# Patient Record
Sex: FEMALE | Race: WHITE | Employment: PART TIME | URBAN - METROPOLITAN AREA
[De-identification: names, ages, dates, MRNs, and addresses within clinical notes are randomized per-mention and may not be internally consistent; named-entity substitution may affect disease eponyms.]

---

## 2017-05-05 ENCOUNTER — EMPLOYEE WELLNESS (OUTPATIENT)
Dept: OTHER | Age: 62
End: 2017-05-05

## 2017-05-05 LAB
CHOLESTEROL, TOTAL: 195 MG/DL (ref 0–199)
GLUCOSE BLD-MCNC: 87 MG/DL (ref 74–107)
HDLC SERPL-MCNC: 92 MG/DL
LDL CHOLESTEROL CALCULATED: 91 MG/DL (ref 0–99)
TRIGL SERPL-MCNC: 60 MG/DL (ref 0–149)

## 2017-12-13 PROBLEM — N81.11 CYSTOCELE, MIDLINE: Status: ACTIVE | Noted: 2017-12-13

## 2018-03-14 ENCOUNTER — NURSE ONLY (OUTPATIENT)
Dept: ORTHOPEDIC SURGERY | Age: 63
End: 2018-03-14
Payer: COMMERCIAL

## 2018-03-14 DIAGNOSIS — M17.10 PATELLOFEMORAL ARTHRITIS: Primary | ICD-10-CM

## 2018-03-14 PROCEDURE — 20610 DRAIN/INJ JOINT/BURSA W/O US: CPT | Performed by: NURSE PRACTITIONER

## 2018-03-14 RX ORDER — HYALURONATE SODIUM 10 MG/ML
20 SYRINGE (ML) INTRAARTICULAR ONCE
Status: COMPLETED | OUTPATIENT
Start: 2018-03-14 | End: 2018-03-14

## 2018-03-14 RX ADMIN — Medication 20 MG: at 08:06

## 2018-03-20 VITALS — WEIGHT: 133 LBS | BODY MASS INDEX: 23.56 KG/M2

## 2018-03-21 ENCOUNTER — NURSE ONLY (OUTPATIENT)
Dept: ORTHOPEDIC SURGERY | Age: 63
End: 2018-03-21
Payer: COMMERCIAL

## 2018-03-21 DIAGNOSIS — M17.10 PATELLOFEMORAL ARTHRITIS: Primary | ICD-10-CM

## 2018-03-21 PROCEDURE — 20610 DRAIN/INJ JOINT/BURSA W/O US: CPT | Performed by: NURSE PRACTITIONER

## 2018-03-21 RX ORDER — HYALURONATE SODIUM 10 MG/ML
20 SYRINGE (ML) INTRAARTICULAR ONCE
Status: COMPLETED | OUTPATIENT
Start: 2018-03-21 | End: 2018-03-21

## 2018-03-21 RX ADMIN — Medication 20 MG: at 08:26

## 2018-03-28 ENCOUNTER — NURSE ONLY (OUTPATIENT)
Dept: ORTHOPEDIC SURGERY | Age: 63
End: 2018-03-28
Payer: COMMERCIAL

## 2018-03-28 DIAGNOSIS — M17.10 PATELLOFEMORAL ARTHRITIS: Primary | ICD-10-CM

## 2018-03-28 PROCEDURE — 20610 DRAIN/INJ JOINT/BURSA W/O US: CPT | Performed by: NURSE PRACTITIONER

## 2018-03-28 RX ORDER — HYALURONATE SODIUM 10 MG/ML
20 SYRINGE (ML) INTRAARTICULAR ONCE
Status: COMPLETED | OUTPATIENT
Start: 2018-03-28 | End: 2018-03-28

## 2018-03-28 RX ADMIN — Medication 20 MG: at 08:21

## 2018-03-28 RX ADMIN — Medication 20 MG: at 08:20

## 2018-05-18 ENCOUNTER — EMPLOYEE WELLNESS (OUTPATIENT)
Dept: OTHER | Age: 63
End: 2018-05-18

## 2018-05-19 LAB
CHOLESTEROL, TOTAL: 169 MG/DL (ref 0–199)
GLUCOSE BLD-MCNC: 70 MG/DL (ref 74–107)
HDLC SERPL-MCNC: 77 MG/DL
LDL CHOLESTEROL CALCULATED: 81 MG/DL (ref 0–99)
TRIGL SERPL-MCNC: 55 MG/DL (ref 0–149)

## 2018-05-29 VITALS — BODY MASS INDEX: 22.85 KG/M2 | WEIGHT: 129 LBS

## 2018-08-06 ENCOUNTER — OFFICE VISIT (OUTPATIENT)
Dept: ORTHOPEDIC SURGERY | Age: 63
End: 2018-08-06
Payer: COMMERCIAL

## 2018-08-06 DIAGNOSIS — M17.0 BILATERAL PRIMARY OSTEOARTHRITIS OF KNEE: Primary | ICD-10-CM

## 2018-08-06 DIAGNOSIS — S83.241A ACUTE MEDIAL MENISCUS TEAR OF RIGHT KNEE, INITIAL ENCOUNTER: ICD-10-CM

## 2018-08-06 DIAGNOSIS — S83.242A ACUTE MEDIAL MENISCUS TEAR, LEFT, INITIAL ENCOUNTER: ICD-10-CM

## 2018-08-06 PROCEDURE — 99213 OFFICE O/P EST LOW 20 MIN: CPT | Performed by: ORTHOPAEDIC SURGERY

## 2018-08-06 NOTE — PROGRESS NOTES
inspection: the skin appears warm, dry and intact. There is  a previous scar over the affected area. There is any cellulitis, lymphedema or cutaneous lesions noted in the lower extremities. Upon palpation there is no induration noted. Neurologic:  Gait: normal;  Motor exam of the lower extremities show ; quadriceps, hamstrings, foot dorsi and plantar flexors intact R.  5/5 and L. 5/5. Deep tendon reflexes are 2/4 at the knees and 2/4 at the ankles with strong extensor hallicus longus motor strength bilaterally. Sensory to both feet is intact to all sensory roots. Cardiovascular: The vascular exam is normal and is well perfused to distal extremities. Distal pulses DP/PT: R. 2+; L. 2+. There is cap refill noted less than two seconds in all digits. There is not edema of the bilateral lower extremities. There is not varicosities noted in the distal extremities. Lymph:  Upon palpation,  there is no lymphadenopathy noted in bilateral lower extremities. Musculoskeletal:  Gait: normal; examination of the nails and digits reveal no cyanosis or clubbing. Lumbar exam:  On visual inspection, there is not deformity of the spine. full range of motion, no tenderness, palpable spasm or pain on motion. Special tests: Straight Leg Raise negative, Ihsan test negative. Hip exam:   Upon inspection, there is not deformity noted. Upon palpation there is not tenderness. ROM: is  full and symmetrical.   Strength: Hip Flexors 5/5; Hip Abductors 5/5; Hip Adduction 5/5. Knee exam:  Bilateral knee exam shows;  range of motion of R. Knee is 0 to 130, and L. Knee is 0 to 130. The patient does have  pain on motion, effusion is none, there is tenderness over the  lateral, anterior region, there are not any masses, there is not ligamentous instability, there is not  deformity noted. Knee exam: bilateral positive for moderate crepitations, some mild tenderness laxity is noted with  stress.   There is not a popliteal cyst.    R. Knee:  Lachman's negative, Anterior Drawer negative, Posterior Drawer negative  Britt's positive, Thallasy  positive,   PF grind test positive, Apprehension test negative, Patellar J sign  negative  L. Knee:  Lachman's negative, Anterior Drawer negative, Posterior Drawer negative  Britt's positive, Thallasy  positive,   PF grind test positive, Apprehension test negative,  Patellar J sign  negative    Xray Exam:  Left knee:  1. No acute fracture or dislocation. 2. Mild to moderate degenerative joint disease of the left knee. Right knee: The bones appear to be in anatomic alignment. No fracture or foreign body is identified. There is joint space loss . The are degenerative changes present .           Radiographic findings reviewed with patient    Assessment:  Encounter Diagnoses   Name Primary?  Bilateral primary osteoarthritis of knee Yes    Acute medial meniscus tear of right knee, initial encounter     Acute medial meniscus tear, left, initial encounter        Plan:  Natural history and expected course discussed. Questions answered. Educational materials distributed. Rest, ice, compression, and elevation (RICE) therapy. Reduction in offending activity.   MRI bilateral knees as she has undergone many conservative treatments without relief

## 2018-08-21 ENCOUNTER — HOSPITAL ENCOUNTER (OUTPATIENT)
Dept: MRI IMAGING | Age: 63
Discharge: HOME | End: 2018-08-23
Payer: COMMERCIAL

## 2018-08-21 DIAGNOSIS — S83.241A ACUTE MEDIAL MENISCUS TEAR OF RIGHT KNEE, INITIAL ENCOUNTER: ICD-10-CM

## 2018-08-21 DIAGNOSIS — M17.0 BILATERAL PRIMARY OSTEOARTHRITIS OF KNEE: ICD-10-CM

## 2018-08-21 PROCEDURE — 73721 MRI JNT OF LWR EXTRE W/O DYE: CPT

## 2018-08-27 ENCOUNTER — OFFICE VISIT (OUTPATIENT)
Dept: ORTHOPEDIC SURGERY | Age: 63
End: 2018-08-27
Payer: COMMERCIAL

## 2018-08-27 VITALS — HEIGHT: 63 IN | WEIGHT: 129 LBS | TEMPERATURE: 98 F | BODY MASS INDEX: 22.86 KG/M2

## 2018-08-27 DIAGNOSIS — M17.10 PATELLOFEMORAL ARTHRITIS: Primary | ICD-10-CM

## 2018-08-27 PROCEDURE — 99215 OFFICE O/P EST HI 40 MIN: CPT | Performed by: ORTHOPAEDIC SURGERY

## 2018-08-27 NOTE — PROGRESS NOTES
Chief Complaint   Patient presents with    Knee Pain     Bilateral Knee, MRI F/U       Subjective:     Patient ID: Rosa Calle is a 58 y.o..  female    Knee Pain  Patient follow up today with her bilateral knee pain. She underwent Euflexxa injections in March. She states she the visco supplementation with minimal relief. She did have CSI with minimal relief as well. She has difficulty going up and down the steps. She has had an MRI of bilateral knees and is here today for results.     Past Medical History:   Diagnosis Date    Back pain     Female cystocele 2017    Fibromyalgia     Rectocele      Past Surgical History:   Procedure Laterality Date    CYSTOSCOPY  12/13/2017    anterior and posterior vaginal repair    ROTATOR CUFF REPAIR         Current Outpatient Prescriptions:     docusate sodium (COLACE) 100 MG capsule, Take 1 capsule by mouth daily as needed for Constipation, Disp: 60 capsule, Rfl: 0    HYDROcodone-acetaminophen (NORCO) 5-325 MG per tablet, Take 1 tablet by mouth every 6 hours as needed for Pain ., Disp: 10 tablet, Rfl: 0    Omega-3 Fatty Acids (OMEGA-3 FISH OIL PO), Take by mouth 2 times daily, Disp: , Rfl:     B-Complex-C CAPS, Take by mouth daily, Disp: , Rfl:     calcium carbonate (OSCAL) 500 MG TABS tablet, Take 500 mg by mouth daily, Disp: , Rfl:     Bone Growth Stimulator (E) MISC, Take 3 tablets by mouth nightly, Disp: , Rfl:     OIL OF OREGANO PO, Take by mouth 2 times daily, Disp: , Rfl:     aspirin 81 MG tablet, Take 81 mg by mouth nightly, Disp: , Rfl:     Turmeric (CURCUMIN 95) 500 MG CAPS, Take by mouth daily, Disp: , Rfl:     vitamin C (ASCORBIC ACID) 500 MG tablet, Take 500 mg by mouth 2 times daily, Disp: , Rfl:     docusate sodium (COLACE) 100 MG capsule, Take 100 mg by mouth 2 times daily, Disp: , Rfl:     fluticasone (FLONASE) 50 MCG/ACT nasal spray, 1 spray by Nasal route daily (Patient taking differently: 1 spray by Nasal route daily as needed ), Disp: 1 Bottle, Rfl: 3  Allergies   Allergen Reactions    Sulfa Antibiotics     Percocet [Oxycodone-Acetaminophen] Rash     Social History     Social History    Marital status:      Spouse name: N/A    Number of children: N/A    Years of education: N/A     Occupational History    Not on file. Social History Main Topics    Smoking status: Former Smoker     Quit date: 11/6/2014    Smokeless tobacco: Never Used    Alcohol use No    Drug use: No    Sexual activity: Not on file     Other Topics Concern    Not on file     Social History Narrative    No narrative on file     History reviewed. No pertinent family history. REVIEW OF SYSTEMS:     General/Constitution:  (-)weight loss, (-)fever, (-)chills, (-)weakness. Skin: (-) rash,(-) psoriasis,(-) eczema, (-)skin cancer. Musculoskeletal: (-) fractures,  (-) dislocations,(-) collagen vascular disease, (-) fibromyalgia, (-) multiple sclerosis, (-) muscular dystrophy, (-) RSD,(-) joint pain (-)swelling, (-) joint pain,swelling. Neurologic: (-) epilepsy, (-)seizures,(-) brain tumor,(-) TIA, (-)stroke, (-)headaches, (-)Parkinson disease,(-) memory loss, (-) LOC. Cardiovascular: (-) Chest pain, (-) swelling in legs/feet, (-) SOB, (-) cramping in legs/feet with walking. Respiratory: (-) SOB, (-) Coughing, (-) night sweats. GI: (-) nausea, (-) vomiting, (-) diarrhea, (-) blood in stool, (-) gastric ulcer. Psychiatric: (-) Depression, (-) Anxiety, (-) bipolar disease, (-) Alzheimer's Disease  Allergic/Immunologic: (-) allergies latex, (-) allergies metal, (-) skin sensitivity. Hematlogic: (-) anemia, (-) blood transfusion, (-) DVT/PE, (-) Clotting disorders      Subjective:    Constitution:  Ht. 5 ft 3 in. , Wt. 130 lbs. Psycihatric:  The patient is alert and oriented x 3, appears to be stated age and in no distress. Respiratory:  Respiratory effort is not labored. Patient is not gasping.   Palpation of the chest reveals no tactile detail. Assessment:  Encounter Diagnoses   Name Primary?  Patellofemoral arthritis Yes       Plan:  I had a lengthy discussion with the patient regarding their diagnosis. I explained treatment options including surgical vs non surgical treatment. I reviewed in detail the risks and benefits and outlined the procedure in detail with expected outcomes and possible complications. I also discussed non surgical treatment such as injections (CSI and visco supplementation), physical therapy, topical creams and NSAID's. They have elected for surgical management at this time. The risks and benefits of a knee arthroscopy were discussed with the patient. The risks are including but not limited to: infection, injuries to blood vessels and nerves, non relief of symptoms, arthrofibrosis of knee, need for further operative intervention, blood loss, PE/DVT, MI and death. The risks are understood by the patient and they wish to proceed with a Bilateral knee arthroscopy, medial menisectomy and debridement. She would like to think about the options and we will see her back once she decides. At least 40 minutes was spent discussing the diagnosis and treatment options with the patient with at least 50% of the time was spent with decision making and counseling the patient.

## 2018-10-26 ENCOUNTER — TELEPHONE (OUTPATIENT)
Dept: ADMINISTRATIVE | Age: 63
End: 2018-10-26

## 2018-11-26 ENCOUNTER — HOSPITAL ENCOUNTER (OUTPATIENT)
Age: 63
Discharge: HOME | End: 2018-11-28
Payer: COMMERCIAL

## 2018-11-26 PROCEDURE — 87624 HPV HI-RISK TYP POOLED RSLT: CPT

## 2018-11-26 PROCEDURE — 88175 CYTOPATH C/V AUTO FLUID REDO: CPT

## 2018-12-06 LAB
CORRESPONDING PAP CASE #: NORMAL
HPV, HIGH RISK: NEGATIVE

## 2018-12-07 ENCOUNTER — NURSE ONLY (OUTPATIENT)
Dept: ORTHOPEDIC SURGERY | Age: 63
End: 2018-12-07
Payer: COMMERCIAL

## 2018-12-07 DIAGNOSIS — M17.0 BILATERAL PRIMARY OSTEOARTHRITIS OF KNEE: Primary | ICD-10-CM

## 2018-12-07 PROCEDURE — 20610 DRAIN/INJ JOINT/BURSA W/O US: CPT | Performed by: NURSE PRACTITIONER

## 2018-12-07 NOTE — PROGRESS NOTES
Verbal and written consent was obtained by the patient. The following is a well known to me that is here for bilateral knee injections. They are here for  Euflexxa # 1. Her knees were prepped in sterile fashion. Euflexxa 20 mg injected to Bilateral knees. The patient tolerated the injections well and I will see the patient back in 1 week for repeat injections.

## 2018-12-12 ENCOUNTER — NURSE ONLY (OUTPATIENT)
Dept: ORTHOPEDIC SURGERY | Age: 63
End: 2018-12-12
Payer: COMMERCIAL

## 2018-12-12 ENCOUNTER — HOSPITAL ENCOUNTER (OUTPATIENT)
Dept: GENERAL RADIOLOGY | Age: 63
Discharge: HOME | End: 2018-12-14
Payer: COMMERCIAL

## 2018-12-12 DIAGNOSIS — Z12.31 VISIT FOR SCREENING MAMMOGRAM: ICD-10-CM

## 2018-12-12 DIAGNOSIS — Z13.820 ENCOUNTER FOR SCREENING FOR OSTEOPOROSIS: ICD-10-CM

## 2018-12-12 DIAGNOSIS — M17.0 BILATERAL PRIMARY OSTEOARTHRITIS OF KNEE: Primary | ICD-10-CM

## 2018-12-12 DIAGNOSIS — R52 PAIN: ICD-10-CM

## 2018-12-12 PROCEDURE — 20610 DRAIN/INJ JOINT/BURSA W/O US: CPT | Performed by: NURSE PRACTITIONER

## 2018-12-12 PROCEDURE — 77063 BREAST TOMOSYNTHESIS BI: CPT

## 2018-12-12 PROCEDURE — 74022 RADEX COMPL AQT ABD SERIES: CPT

## 2018-12-12 PROCEDURE — 77080 DXA BONE DENSITY AXIAL: CPT

## 2018-12-12 PROCEDURE — 99999 PR OFFICE/OUTPT VISIT,PROCEDURE ONLY: CPT | Performed by: NURSE PRACTITIONER

## 2018-12-19 ENCOUNTER — NURSE ONLY (OUTPATIENT)
Dept: ORTHOPEDIC SURGERY | Age: 63
End: 2018-12-19
Payer: COMMERCIAL

## 2018-12-19 DIAGNOSIS — M17.0 BILATERAL PRIMARY OSTEOARTHRITIS OF KNEE: Primary | ICD-10-CM

## 2018-12-19 PROCEDURE — 20610 DRAIN/INJ JOINT/BURSA W/O US: CPT | Performed by: NURSE PRACTITIONER

## 2018-12-19 RX ORDER — HYALURONATE SODIUM 10 MG/ML
20 SYRINGE (ML) INTRAARTICULAR ONCE
Status: COMPLETED | OUTPATIENT
Start: 2018-12-19 | End: 2018-12-19

## 2018-12-19 RX ADMIN — Medication 20 MG: at 08:45

## 2018-12-19 RX ADMIN — Medication 20 MG: at 08:44

## 2020-01-07 ENCOUNTER — HOSPITAL ENCOUNTER (OUTPATIENT)
Dept: GENERAL RADIOLOGY | Age: 65
Discharge: HOME | End: 2020-01-09
Payer: MEDICARE

## 2020-01-07 PROCEDURE — 77063 BREAST TOMOSYNTHESIS BI: CPT

## 2020-04-24 ENCOUNTER — HOSPITAL ENCOUNTER (OUTPATIENT)
Age: 65
Discharge: HOME | End: 2020-04-26
Payer: MEDICARE

## 2020-04-24 ENCOUNTER — TELEPHONE (OUTPATIENT)
Dept: PRIMARY CARE CLINIC | Age: 65
End: 2020-04-24

## 2020-04-24 ENCOUNTER — OFFICE VISIT (OUTPATIENT)
Dept: PRIMARY CARE CLINIC | Age: 65
End: 2020-04-24
Payer: MEDICARE

## 2020-04-24 VITALS
SYSTOLIC BLOOD PRESSURE: 118 MMHG | HEIGHT: 63 IN | OXYGEN SATURATION: 98 % | RESPIRATION RATE: 18 BRPM | BODY MASS INDEX: 22.68 KG/M2 | TEMPERATURE: 98 F | HEART RATE: 77 BPM | WEIGHT: 128 LBS | DIASTOLIC BLOOD PRESSURE: 76 MMHG

## 2020-04-24 LAB — SARS-COV-2, NAAT: NOT DETECTED

## 2020-04-24 PROCEDURE — G8420 CALC BMI NORM PARAMETERS: HCPCS | Performed by: PHYSICIAN ASSISTANT

## 2020-04-24 PROCEDURE — 1123F ACP DISCUSS/DSCN MKR DOCD: CPT | Performed by: PHYSICIAN ASSISTANT

## 2020-04-24 PROCEDURE — G8399 PT W/DXA RESULTS DOCUMENT: HCPCS | Performed by: PHYSICIAN ASSISTANT

## 2020-04-24 PROCEDURE — 99214 OFFICE O/P EST MOD 30 MIN: CPT | Performed by: PHYSICIAN ASSISTANT

## 2020-04-24 PROCEDURE — G8428 CUR MEDS NOT DOCUMENT: HCPCS | Performed by: PHYSICIAN ASSISTANT

## 2020-04-24 PROCEDURE — 4040F PNEUMOC VAC/ADMIN/RCVD: CPT | Performed by: PHYSICIAN ASSISTANT

## 2020-04-24 PROCEDURE — 3017F COLORECTAL CA SCREEN DOC REV: CPT | Performed by: PHYSICIAN ASSISTANT

## 2020-04-24 PROCEDURE — U0002 COVID-19 LAB TEST NON-CDC: HCPCS

## 2020-04-24 PROCEDURE — 1090F PRES/ABSN URINE INCON ASSESS: CPT | Performed by: PHYSICIAN ASSISTANT

## 2020-04-24 PROCEDURE — 1036F TOBACCO NON-USER: CPT | Performed by: PHYSICIAN ASSISTANT

## 2020-04-24 RX ORDER — BIOTIN 1 MG
TABLET ORAL
COMMUNITY

## 2020-04-24 RX ORDER — CHOLECALCIFEROL (VITAMIN D3) 10(400)/ML
DROPS ORAL
COMMUNITY

## 2020-04-24 NOTE — PATIENT INSTRUCTIONS
with the person who is sick should not stay in the same room with them, or they should wear a facemask if they enter a room with the person who is sick. Cover your coughs and sneezes   ; Cover: Cover your mouth and nose with a tissue when you cough or sneeze.   ; Dispose: Throw used tissues in a lined trash can.   ; Wash hands: Immediately wash your hands with soap and water for at least 20 seconds or, if soap and water are not available, clean your hands with an alcohol-based hand  that contains at least 60% alcohol. Clean your hands often   ; Wash hands: Wash your hands often with soap and water for at least 20 seconds, especially after blowing your nose, coughing, or sneezing; going to the bathroom; and before eating or preparing food.   ; Hand : If soap and water are not readily available, use an alcohol-based hand  with at least 60% alcohol, covering all surfaces of your hands and rubbing them together until they feel dry.   ; Soap and water: Soap and water are the best option if hands are visibly dirty.   ; Avoid touching: Avoid touching your eyes, nose, and mouth with unwashed hands. Handwashing Tips   ; Wet your hands with clean, running water (warm or cold), turn off the tap, and apply soap.  ; Lather your hands by rubbing them together with the soap. Lather the backs of your hands, between your fingers, and under your nails. ; Scrub your hands for at least 20 seconds. Need a timer? Hum the [place] from beginning to end twice.  ; Rinse your hands well under clean, running water.  ; Dry your hands using a clean towel or air dry them. Avoid sharing personal household items   ; Do not share: You should not share dishes, drinking glasses, cups, eating utensils, towels, or bedding with other people or pets in your home.   ; Wash thoroughly after use: After using these items, they should be washed thoroughly with soap and water.   Clean all high-touch surfaces

## 2020-04-24 NOTE — TELEPHONE ENCOUNTER
Patient enrolled in the loop program with email [email or web address]    email confirmed with patient

## 2020-04-24 NOTE — PROGRESS NOTES
times daily, Disp: , Rfl:     aspirin 81 MG tablet, Take 81 mg by mouth nightly, Disp: , Rfl:     Turmeric (CURCUMIN 95) 500 MG CAPS, Take by mouth daily, Disp: , Rfl:     vitamin C (ASCORBIC ACID) 500 MG tablet, Take 500 mg by mouth 2 times daily, Disp: , Rfl:     fluticasone (FLONASE) 50 MCG/ACT nasal spray, 1 spray by Nasal route daily (Patient taking differently: 1 spray by Nasal route daily as needed ), Disp: 1 Bottle, Rfl: 3    HYDROcodone-acetaminophen (NORCO) 5-325 MG per tablet, Take 1 tablet by mouth every 6 hours as needed for Pain . (Patient not taking: Reported on 2020), Disp: 10 tablet, Rfl: 0    docusate sodium (COLACE) 100 MG capsule, Take 100 mg by mouth 2 times daily, Disp: , Rfl:     Allergies   Allergen Reactions    Sulfa Antibiotics     Percocet [Oxycodone-Acetaminophen] Rash       Social History     Tobacco Use    Smoking status: Former Smoker     Last attempt to quit: 2014     Years since quittin.4    Smokeless tobacco: Never Used   Substance Use Topics    Alcohol use: No     Alcohol/week: 0.0 standard drinks    Drug use: No       Patient lives at home. Vitals:    20 0919   BP: 118/76   Pulse: 77   Resp: 18   Temp: 98 °F (36.7 °C)   SpO2: 98%   Weight: 128 lb (58.1 kg)   Height: 5' 3\" (1.6 m)       Exam:  Nurse's notes and vital signs reviewed. The patient is not hypoxic. ? General: Alert, no acute distress, patient resting comfortably Patient is not toxic or lethargic. Skin: Warm, intact, no pallor noted. There is no evidence of rash at this time. Head: Normocephalic, atraumatic  Eye: Normal conjunctiva  Ears, Nose, Throat: Right tympanic membrane clear, left tympanic membrane clear. No drainage or discharge noted. No pre- or post-auricular tenderness, erythema, or swelling noted. No rhinorrhea or congestion noted. Posterior oropharynx shows no erythema, tonsillar hypertrophy, or exudate. the uvula is midline. No trismus or drooling is noted.    Moist

## 2020-04-30 ENCOUNTER — TELEPHONE (OUTPATIENT)
Dept: PRIMARY CARE CLINIC | Age: 65
End: 2020-04-30

## 2020-07-02 ENCOUNTER — TELEPHONE (OUTPATIENT)
Dept: SLEEP MEDICINE | Age: 65
End: 2020-07-02

## 2020-07-08 ENCOUNTER — TELEMEDICINE (OUTPATIENT)
Dept: SLEEP MEDICINE | Age: 65
End: 2020-07-08
Payer: MEDICARE

## 2020-07-08 PROCEDURE — 3017F COLORECTAL CA SCREEN DOC REV: CPT | Performed by: INTERNAL MEDICINE

## 2020-07-08 PROCEDURE — G8399 PT W/DXA RESULTS DOCUMENT: HCPCS | Performed by: INTERNAL MEDICINE

## 2020-07-08 PROCEDURE — G8428 CUR MEDS NOT DOCUMENT: HCPCS | Performed by: INTERNAL MEDICINE

## 2020-07-08 PROCEDURE — 1123F ACP DISCUSS/DSCN MKR DOCD: CPT | Performed by: INTERNAL MEDICINE

## 2020-07-08 PROCEDURE — 1090F PRES/ABSN URINE INCON ASSESS: CPT | Performed by: INTERNAL MEDICINE

## 2020-07-08 PROCEDURE — 99204 OFFICE O/P NEW MOD 45 MIN: CPT | Performed by: INTERNAL MEDICINE

## 2020-07-08 PROCEDURE — 4040F PNEUMOC VAC/ADMIN/RCVD: CPT | Performed by: INTERNAL MEDICINE

## 2020-07-08 NOTE — PROGRESS NOTES
Florin Maldonado is a 71 y.o. female being evaluated by a Virtual Visit (video visit) encounter to address concerns as mentioned below. A caregiver was present when appropriate. Due to this being a TeleHealth encounter (During RWK-63 public health emergency), evaluation of the following organ systems was limited: Vitals/Constitutional/EENT/Resp/CV/GI//MS/Neuro/Skin/Heme-Lymph-Imm. Pursuant to the emergency declaration under the [address] [place] [address] and the [employer] Act, this Virtual Visit was conducted with patient's (and/or legal guardian's) consent, to reduce the patient's risk of exposure to COVID-19 and provide necessary medical care. The patient (and/or legal guardian) has also been advised to contact this office for worsening conditions or problems, and seek emergency medical treatment and/or call 911 if deemed necessary. Patient identification was verified at the start of the visit: Yes    Total time spent for this encounter: 47 min  Start time: 4:13pm  End time: 4:50pm    Services were provided through a video synchronous discussion virtually to substitute for in-person clinic visit. Patient was located at home. Provider was located at the [facility]. --Yung March MD on 2020 at 4:11 PM    An electronic signature was used to authenticate this note. [facility] Sleep Medicine    Patient Name: Florin Maldonado  Age: 71 y.o.   : 1955    Date of Visit: 20      HPI   Florin Maldonado is a 71 y.o. woman with fibromyalgia and history of BENITEZ on oral appliance therapy who presents as a new patient to Sleep Clinic, referred by her PCP Dr. Lisa Bland, for poor sleep quality. \"I've never been a very good sleeper. \" In the mid-, she was diagnosed with fibromyalgia, and she knows that that has played into her sleep disturbances.  She says she is in a \"constant dream state\" and she never feels like she gets a good night sleep, unless she takes half a Benadryl. She completed a sleep study in 2009 (at [facility], with severe BENITEZ - AHI 37), and at that time was dealing with significant back pain -- she wonders if that may have contributed to the findings, as she does not agree that she has sleep apnea. Between 5-10 years ago, her dentist made her an oral appliance; she had been wearing it, but didn't notice any difference in the quality of her sleep. She recently stopped using it because it was thought to be contributing to TMJ symptoms. She has never tried CPAP other than in the sleep study. The dreams are vivid and occasionally intense. She denies any dream-enactment behavior, leg kicking/jerking, or sleepwalking. She considers her dreams to be \"exhausting. \"    She does not snore, other than when she has a URI. She has not been told of pauses in her breathing. She denies the sensation of choking/gasping. She does not usually feel refreshed in the morning. She does have headaches in the morning but she attributes this to her neck positioning. She wakes up a lot with stiffness that she attributes to her fibromyalgia. She usually sleeps on her side. She cannot fall asleep on her back because it is physically uncomfortable. Her weight has generally been stable. She tries to get 7-8 hours of sleep nightly. She usually gets in bed between 9:30-10:30pm, depending on what she has been planned for her morning workout. She usually falls asleep pretty quickly. Only once every 1-2 months does it take her a long time to fall asleep. She usually wakes up after 1.5-2 hours, uses the bathroom, and then gets back into bed. She may wake up 2-3 times total to use the bathroom but can fall back asleep easily. She usually wakes up between 5-6:30am on most weekdays; she may sleep in on Sundays.  She very rarely naps (doesn't have time with her full time job) and in general feels like she has enough energy. She denies any drowsiness while driving. Her  has severe BENITEZ and wears CPAP. STOP-BANG score of 1/8 for  (-)snoring, (-)daytime fatigue, (-)observed apneas, (-)high blood pressure, (-)BMI>35, (+)age >50, (-)neck circumference >15in, (-)female gender    [place] Sleepiness Scale not completed today (scores of 10 or higher are considered indicative of excessive daytime sleepiness)    [facility] Depression Scale (BEATRIZ-D) not completed today  (scores of 16 or higher may be suggestive of depressive or mood-related issues). PMH:  Past Medical History:   Diagnosis Date    Back pain     Female cystocele 2017    Fibromyalgia     Rectocele         PSH:  Past Surgical History:   Procedure Laterality Date    CYSTOSCOPY  2017    anterior and posterior vaginal repair    ROTATOR CUFF REPAIR     Hx of adenotonsillectomy    Soc Hx:  Social History     Tobacco Use    Smoking status: Former Smoker     Last attempt to quit: 2014     Years since quittin.6    Smokeless tobacco: Never Used   Substance Use Topics    Alcohol use: No     Alcohol/week: 0.0 standard drinks    Drug use: No        Fam Hx:  No family history on file. Current Outpatient Medications   Medication Sig Dispense Refill    Biotin 1000 MCG TABS Take by mouth      Cholecalciferol (VITAMIN D) 10 MCG/ML LIQD Take by mouth      HYDROcodone-acetaminophen (NORCO) 5-325 MG per tablet Take 1 tablet by mouth every 6 hours as needed for Pain .  (Patient not taking: Reported on 2020) 10 tablet 0    Omega-3 Fatty Acids (OMEGA-3 FISH OIL PO) Take by mouth 2 times daily      B-Complex-C CAPS Take by mouth daily      calcium carbonate (OSCAL) 500 MG TABS tablet Take 500 mg by mouth daily      Bone Growth Stimulator (E) MISC Take 3 tablets by mouth nightly      OIL OF OREGANO PO Take by mouth 2 times daily      aspirin 81 MG tablet Take 81 mg by mouth nightly      Turmeric (CURCUMIN 95) 500 MG CAPS Take by mouth daily      vitamin C (ASCORBIC ACID) 500 MG tablet Take 500 mg by mouth 2 times daily      docusate sodium (COLACE) 100 MG capsule Take 100 mg by mouth 2 times daily      fluticasone (FLONASE) 50 MCG/ACT nasal spray 1 spray by Nasal route daily (Patient taking differently: 1 spray by Nasal route daily as needed ) 1 Bottle 3     No current facility-administered medications for this visit. Review of Systems  Review of Systems   +weight stable  +chronic pain related to fibromyalgia  Objective:   Physical Exam  There were no vitals taken for this visit. There were no vitals filed for this visit. Physical Exam  Thin  female, alert and interactive  Mallampati IV, tonsils surgically absent     PROCEDURE HISTORY  1. Split-night PSG 7/12/2009 at [address] (BMI 23.7): AHI 37, supine AHI 67 (63% of TST), non-supine AHI 22, REM AHI 32 (19% of TST), non-REM AHI 38, SpO2 lavern 90% --> CPAP 4-9 cm of water trialed, no optimal pressure found in part due to poor sleep efficiency      PERTINENT LAB RESULTS  TSH   Date Value Ref Range Status   11/28/2017 4.210 (H) 0.270 - 4.200 uIU/mL Final      No results found for: FERRITIN     Assessment & Plan:   Mihaela Cr is a 71 y.o. woman with fibromyalgia and history of BENITEZ on oral appliance therapy who presents as a new patient to Sleep Clinic with a chief complaint of poor sleep quality secondary to \"exhausting, constant dreams. \" She has a history of severe BENITEZ as diagnosed per PSG in 2009 at [facility] (at similar BMI to current), for which she has used oral appliance therapy to unknown efficacy. She denies any obvious symptoms of BENITEZ and has a normal STOP-BANG score. It is possible that her perception of constant dream state is secondary to frequent REM-related sleep-disordered breathing.  Given her history, a repeat study is indicated at this time in order to reassess potential BENITEZ and discuss treatment options if present. 1. Obstructive Sleep Apnea      Normal STOP-BANG 1/8 but with history of severe BENITEZ per PSG 2009 at [facility]. Only treated with oral appliance therapy to this point (with uncertain efficacy). Will proceed with in-lab split-night polysomnography to re-evaluate. Order placed today for [facility].  Discussed pathophysiology of BENITEZ and its impact on daily well-being, as well as cardiometabolic and neurocognitive health (particularly in moderate-severe cases).  Will review CPAP and other potential therapy options if PSG is positive. Not discussed in detail today.  Counseled on risks of driving while drowsy.  The patient is aware that weight gain may worsen the severity of obstructive sleep apnea. 2. Sleep Disturbances      Contributing factors include perception of chronic dream state and chronic pain secondary to fibromyalgia. Patient seems to adhere to good sleep hygiene practices.  Will assess results of PSG and benefit of treatment, if ultimately prescribed. Patient will follow up within 3 months of completing PSG. A total of 47 minutes' time was spent with the patient, of which >50% was spent in face-to-face discussion and counseling.      Angie Alcantar MD

## 2020-07-08 NOTE — Clinical Note
David Messina,    Can you please send a copy of my note to her PCP? Don't think he is in [address] Also, please add her to the list of patients that Britney Trivedi will need to see. We'll see what her sleep study shows but I imagine she will probably need to see him.     Thanks, Subha Viveros

## 2020-07-09 ENCOUNTER — TELEPHONE (OUTPATIENT)
Dept: SLEEP CENTER | Age: 65
End: 2020-07-09

## 2020-07-17 ENCOUNTER — NURSE ONLY (OUTPATIENT)
Dept: PRIMARY CARE CLINIC | Age: 65
End: 2020-07-17

## 2020-07-17 ENCOUNTER — HOSPITAL ENCOUNTER (OUTPATIENT)
Age: 65
Discharge: HOME | End: 2020-07-19
Payer: MEDICARE

## 2020-07-17 PROCEDURE — U0003 INFECTIOUS AGENT DETECTION BY NUCLEIC ACID (DNA OR RNA); SEVERE ACUTE RESPIRATORY SYNDROME CORONAVIRUS 2 (SARS-COV-2) (CORONAVIRUS DISEASE [COVID-19]), AMPLIFIED PROBE TECHNIQUE, MAKING USE OF HIGH THROUGHPUT TECHNOLOGIES AS DESCRIBED BY CMS-2020-01-R: HCPCS

## 2020-07-19 LAB
SARS-COV-2: NOT DETECTED
SOURCE: NORMAL

## 2020-07-21 ENCOUNTER — TELEPHONE (OUTPATIENT)
Dept: SLEEP CENTER | Age: 65
End: 2020-07-21

## 2020-07-21 ENCOUNTER — TELEPHONE (OUTPATIENT)
Dept: SLEEP MEDICINE | Age: 65
End: 2020-07-21

## 2020-07-22 ENCOUNTER — HOSPITAL ENCOUNTER (OUTPATIENT)
Dept: SLEEP CENTER | Age: 65
Discharge: HOME | End: 2020-07-22
Payer: MEDICARE

## 2020-07-22 PROCEDURE — 95810 POLYSOM 6/> YRS 4/> PARAM: CPT | Performed by: INTERNAL MEDICINE

## 2020-07-22 PROCEDURE — 95810 POLYSOM 6/> YRS 4/> PARAM: CPT

## 2020-07-23 ENCOUNTER — HOSPITAL ENCOUNTER (OUTPATIENT)
Dept: CT IMAGING | Age: 65
Discharge: HOME | End: 2020-07-25
Payer: MEDICARE

## 2020-07-23 PROCEDURE — 2580000003 HC RX 258: Performed by: RADIOLOGY

## 2020-07-23 PROCEDURE — 70498 CT ANGIOGRAPHY NECK: CPT

## 2020-07-23 PROCEDURE — 6360000004 HC RX CONTRAST MEDICATION: Performed by: RADIOLOGY

## 2020-07-23 RX ORDER — SODIUM CHLORIDE 0.9 % (FLUSH) 0.9 %
10 SYRINGE (ML) INJECTION ONCE
Status: COMPLETED | OUTPATIENT
Start: 2020-07-23 | End: 2020-07-23

## 2020-07-23 RX ADMIN — IOPAMIDOL 60 ML: 755 INJECTION, SOLUTION INTRAVENOUS at 17:18

## 2020-07-23 RX ADMIN — Medication 10 ML: at 17:18

## 2020-08-02 NOTE — PROGRESS NOTES
[address]                               SLEEP STUDY REPORT    PATIENT NAME: Edmund Rodriguez                      :        1955  MED REC NO:   [id number]                            ROOM:  ACCOUNT NO:   [de-identified]                           ADMIT DATE: 2020  PROVIDER:     Gunjan Hylton MD    DATE OF STUDY:  2020    FULL-NIGHT POLYSOMNOGRAM REPORT    LOCATION:  [address] [place] [address]. REFERRING PROVIDER:  Gunjan Hylton MD    AGE:  71 years. SEX:  Female. HEIGHT:  5 feet 3 inches. WEIGHT:  128 pounds. BMI:  22.7 kg/m2. NECK CIRCUMFERENCE:  12 inches. SYMPTOMS:  Morning headaches, nocturia, teeth grinding at night, vivid  dreams. The English Sleepiness Scale was 3/24 (scores above or equal to  10 are suggestive of hypersomnolence). INDICATIONS:  To re-evaluate the severity of obstructive sleep apnea. MEDICAL HISTORY:  Fibromyalgia, history of obstructive sleep apnea, on  oral appliance therapy (PSG in  at [facility] with AHI 37). MEDICATIONS:  Oxybutynin. DESCRIPTION:  This full-night polysomnogram consisted of EEG, EOG, EMG,  and two-lead ECG monitoring. Oronasal airflow (nasal pressure  transducer and thermistor), chest and abdominal efforts by respiratory  inductive plethysmography or polyvinylidene fluoride sensor, and pulse  oximetry were monitored as well. Hypopneas were scored as at least a  30% reduction in amplitude of the semiquantitative flow signal,  associated with a 4% or greater oxygen desaturation. Respiratory  effort-related arousals (RERAs) were scored as at least a 30% reduction  in amplitude of the semiquantitative flow signal, associated with an EEG  microarousal.    FINDINGS:  SLEEP CONTINUITY AND SLEEP ARCHITECTURE:  Lights were turned off at  10:28 p.m. and lights were turned on at 5:56 a.m.   Total recording time  was 447 minutes nature of this  patient's sleep-disordered breathing. 3.  Occasional bruxism was noted. RECOMMENDATIONS:  1. Treatment for mild obstructive sleep apnea should be based on the  patient's symptoms and comorbidities. Treatment options include CPAP  therapy, oral appliance therapy, and/or ENT evaluation. These options  can be further discussed with the patient during followup in the [place] [id number]. 2.  In light of the patient's bruxism, concurrent with her mild  obstructive sleep apnea, oral appliance therapy may be an ideal modality. She can be referred to a sleep dentist if she so desires. 3.  The patient should be counseled against driving while drowsy. 4.  The patient should be aware that weight gain may increase the  severity of obstructive sleep apnea.       Samara Fleischer, MD    D: 08/01/2020 7:37:43       T: 08/01/2020 8:33:08     [email or web address]  Job#: [id number]     Doc#: [id number]    CC:

## 2020-08-04 ENCOUNTER — TELEPHONE (OUTPATIENT)
Dept: SLEEP MEDICINE | Age: 65
End: 2020-08-04

## 2020-08-05 ENCOUNTER — TELEPHONE (OUTPATIENT)
Dept: SLEEP MEDICINE | Age: 65
End: 2020-08-05

## 2020-08-05 NOTE — TELEPHONE ENCOUNTER
Pt called in and started she has appt on 8/12 with Dr Maritza Wade and  would like SS faxed to her at [phone]

## 2020-08-05 NOTE — TELEPHONE ENCOUNTER
Pt ret call and discussed ss results--pt is willing to go to Dr Genoveva Garcia to have new dental appliance made for her BENITEZ and teeth grinding--gave pt Dr's number to call to get appt--pt does still want to come to office to have SS explained to her further and she has several more questions--appt made for 8/26 1500

## 2020-08-10 ENCOUNTER — TELEPHONE (OUTPATIENT)
Dept: SLEEP MEDICINE | Age: 65
End: 2020-08-10

## 2020-08-31 ENCOUNTER — TELEPHONE (OUTPATIENT)
Dept: SLEEP MEDICINE | Age: 65
End: 2020-08-31

## 2020-09-02 NOTE — TELEPHONE ENCOUNTER
Pt ret call and I did explain to her that Dr Denise Zayas does not have a schedule yet and that I will call her as soon as his schedule is put in the system--pt does need either earliest or latest appt d/t work

## 2020-09-25 ENCOUNTER — TELEPHONE (OUTPATIENT)
Dept: SLEEP MEDICINE | Age: 65
End: 2020-09-25

## 2020-09-30 ENCOUNTER — TELEPHONE (OUTPATIENT)
Dept: SLEEP MEDICINE | Age: 65
End: 2020-09-30

## 2020-09-30 NOTE — TELEPHONE ENCOUNTER
Pt sent in xrays from Dr Ronal Barron (Dentist) of her mouth for fitting for dental appliance for apnea

## 2020-10-06 ENCOUNTER — OFFICE VISIT (OUTPATIENT)
Dept: SLEEP MEDICINE | Age: 65
End: 2020-10-06
Payer: MEDICARE

## 2020-10-06 VITALS
WEIGHT: 134.6 LBS | RESPIRATION RATE: 14 BRPM | SYSTOLIC BLOOD PRESSURE: 137 MMHG | OXYGEN SATURATION: 99 % | HEART RATE: 61 BPM | BODY MASS INDEX: 23.85 KG/M2 | DIASTOLIC BLOOD PRESSURE: 76 MMHG | HEIGHT: 63 IN

## 2020-10-06 PROCEDURE — 99214 OFFICE O/P EST MOD 30 MIN: CPT | Performed by: STUDENT IN AN ORGANIZED HEALTH CARE EDUCATION/TRAINING PROGRAM

## 2020-10-06 ASSESSMENT — SLEEP AND FATIGUE QUESTIONNAIRES
HOW LIKELY ARE YOU TO NOD OFF OR FALL ASLEEP WHILE SITTING INACTIVE IN A PUBLIC PLACE: 0
ESS TOTAL SCORE: 3
HOW LIKELY ARE YOU TO NOD OFF OR FALL ASLEEP WHEN YOU ARE A PASSENGER IN A CAR FOR AN HOUR WITHOUT A BREAK: 1
HOW LIKELY ARE YOU TO NOD OFF OR FALL ASLEEP WHILE SITTING AND READING: 1
HOW LIKELY ARE YOU TO NOD OFF OR FALL ASLEEP IN A CAR, WHILE STOPPED FOR A FEW MINUTES IN TRAFFIC: 0
HOW LIKELY ARE YOU TO NOD OFF OR FALL ASLEEP WHILE WATCHING TV: 0
HOW LIKELY ARE YOU TO NOD OFF OR FALL ASLEEP WHILE LYING DOWN TO REST IN THE AFTERNOON WHEN CIRCUMSTANCES PERMIT: 1
HOW LIKELY ARE YOU TO NOD OFF OR FALL ASLEEP WHILE SITTING AND TALKING TO SOMEONE: 0
HOW LIKELY ARE YOU TO NOD OFF OR FALL ASLEEP WHILE SITTING QUIETLY AFTER LUNCH WITHOUT ALCOHOL: 0

## 2020-10-06 NOTE — PATIENT INSTRUCTIONS
It was a pleasure seeing you today Dang Gomez! Summary of Today's Visit         - Benadryl is not recommended for sleep, as it can cause falls and other side effects. Please try the CPAP and we can reevaluate at your next appointment         1. Start APAP 4-15 cwp    New PAP Therapy instructions to be compliant with most insurance coverage:  1. Use PAP device for atleast 4 hours nightly. 2. PAP therapy must be used for 70% of nights (5/7 nights per week)  3. Patient must follow up in the clinic within 30-90 days from getting the PAP device. 2. Contact your DME company about supplies or issues with your machine. As a general guideline, please replace your:  -CPAP mask every 3-6 months  -CPAP hose  every 3-6 months  -Filter every 2-4 weeks  -CPAP/BiPAP/ASV replacements every 5-7+ years     3. Continue healthy weight loss/stabilization, as this is recommended as a long-term intervention. 4. Please do not drive or operate machinery when you feel fatigued/sleepy/drowsy      5. Please try to sleep between 6-8 hours nightly with the CPAP mask    6. Please avoid sedatives, alcohol and caffeinated drinks at/near bed time. 7. Please call your supply (DME) company with any issues with your PAP device. Please call our office with any issues pertaining to the therapy.   ----Please note that complications of BENITEZ if not treated can increase risk for: systemic hypertension, pulmonary hypertension, cardiovascular morbidities (heart attack and stroke), car accidents and all cause mortality. For general questions or scheduling issues, call my nurse at [phone] option [address] [address] -[phone] option 2  f- [phone]             Common Issues and Solutions     Pillow is dislodging mask - Consider getting a CPAP pillow or switching to a mask with the hose at the top. Dry Mouth - Adjust Humidity and/or try Biotene Gel.   (Excessive leak can also cause this)     Leak - Please call your equipment provider  as they may need to adjust your mask. Difficulty tolerating the PAP mask - Contact your equipment company to try a different mask, we can order a \"mini sleep study\"with the sleep tech to try different masks/settings of pressure, also we have a sleep psychologist to help with anxiety related to wearing the PAP mask      ----------------------------------------------------------     For Questions and Concerns: In case of difficulty with your PAP device or mask interface, please FIRST contact your [facility] (The company who provides you the CPAP/BiPAP/ASV machine/supplies). If you need the number for any other DME company, please call my Nurse at [phone] option 2     For questions concerning your [place] appointment: Call [phone] option 2  SLEEP LAB SCHEDULING:        ----------------------------------------------------------     Helpful Web Sites: For patients with ALL SLEEP DISORDERS: American Academy of Sleep Medicine http://sleepeducation. org; or VoiceTrust: https://sleepfoundation. org  For patients with BENITEZ: American Sleep Apnea Association: http://montes de oca.org/. org  For patients with RLS: RLS Foundation: Valerie.diana  For patients with INSOMNIA: https://www.morales.org/. org/articles/sleep/insomnia-causes-and-cures. htm  For patients with DEPRESSION: Logoworks.com.myThings. com/depression            Learning About CPAP for Sleep Apnea  What is CPAP? CPAP/Bi-PAP is a small machine that you use at home every night while you sleep. It increases air pressure in your throat to keep your airway open. When you have sleep apnea, this can help you sleep better so you feel much better. CPAP stands for \"continuous positive airway pressure. \" Bi-PAP is a different PAP setting that allows for different pressures when you breathe in and out.    The CPAP/Bi-PAP machine will have one of the following:  · A mask that covers your nose and mouth  · Prongs that fit into your nose  · A mask that covers your nose only, the most common type. This type is called NCPAP. The N stands for \"nasal.\"  Why is it done? CPAP is a common/effective treatment for obstructive sleep apnea. How does it help? · CPAP can help you have more normal sleep, so you feel less sleepy and more alert during the daytime through the treatment of sleep apnea. · CPAP may help keep heart failure or other heart problems from getting worse. · CPAP may help lower your blood pressure. · If you use CPAP, your bed partner may also sleep better because you are snoring less. What are the side effects? Some people who use CPAP have:  · A dry or stuffy nose and a sore throat. · Irritated skin on the face. · Sore eyes. · Bloating. If you have any of these problems, work with your doctor to fix them. Here are some things you can try:  · Be sure the mask or nasal prongs fit well. · See if your doctor can adjust the pressure of your CPAP. · If your nose is dry, try a humidifier. If these things do not help, you might try a different type of machine. Some machines have air pressure that adjusts on its own. Others have air pressures that are different when you breathe in than when you breathe out. This may reduce discomfort caused by too much pressure in your nose. Where can you learn more? Go to https://Tunespeakpe[place] Teddy Bear.[employer]. org and sign in to your Insight Direct (ServiceCEO) account. Enter N062 in the [facility] box to learn more about \"Learning About CPAP for Sleep Apnea. \"     If you do not have an account, please click on the \"Sign Up Now\" link. Current as of: February 24, 2020               Content Version: 12.5  © 2006-2020 Healthwise, Incorporated. Care instructions adapted under license by [facility] ([facility]).  If you have questions about a medical condition or this instruction, always ask your healthcare professional. [employer] disclaims any warranty or liability for your use of this information.

## 2020-10-06 NOTE — PROGRESS NOTES
Kassi Fisher is a 76 y.o. female who is being seen today for   Chief Complaint   Patient presents with    Sleep Apnea           Review of Last Visit Summary:  PSG ordered by Dr. Armond Siddiqi      Interim History:     Kassi Fisher is a 76 y.o. female in office for  follow up. She presents alone today to go over her sleep study results. She does report a history of oral appliance usage, which she states she periodically uses due to TMJ discomfort, Patient was initially diagnosed at [facility] with severe BENITEZ. She is interested in pursuing alternative therapy. PS2020, AHI- 6.8, SpO2 89%    Past Medical History:  Past Medical History:   Diagnosis Date    Back pain     Female cystocele 2017    Fibromyalgia     Rectocele        Past Surgical History:        Procedure Laterality Date    CYSTOSCOPY  2017    anterior and posterior vaginal repair    ROTATOR CUFF REPAIR         Allergies:  is allergic to sulfa antibiotics and percocet [oxycodone-acetaminophen]. Social History:    Social History     Tobacco Use    Smoking status: Former Smoker     Last attempt to quit: 2014     Years since quittin.9    Smokeless tobacco: Never Used   Substance Use Topics    Alcohol use: No     Alcohol/week: 0.0 standard drinks    Drug use: No        Family History:   No family history on file.     Current Medications:    Current Outpatient Medications:     mirabegron (MYRBETRIQ) 50 MG TB24, Take 50 mg by mouth daily, Disp: , Rfl:     Biotin 1000 MCG TABS, Take by mouth, Disp: , Rfl:     Cholecalciferol (VITAMIN D) 10 MCG/ML LIQD, Take by mouth, Disp: , Rfl:     Omega-3 Fatty Acids (OMEGA-3 FISH OIL PO), Take by mouth 2 times daily, Disp: , Rfl:     calcium carbonate (OSCAL) 500 MG TABS tablet, Take 500 mg by mouth daily, Disp: , Rfl:     Bone Growth Stimulator (E) MISC, Take 3 tablets by mouth nightly, Disp: , Rfl:     OIL OF OREGANO PO, Take by mouth 2 times daily, Disp: , Rfl:     aspirin 81 MG tablet, Take 81 mg by mouth nightly, Disp: , Rfl:     Turmeric (CURCUMIN 95) 500 MG CAPS, Take by mouth daily, Disp: , Rfl:     vitamin C (ASCORBIC ACID) 500 MG tablet, Take 500 mg by mouth 2 times daily, Disp: , Rfl:     HYDROcodone-acetaminophen (NORCO) 5-325 MG per tablet, Take 1 tablet by mouth every 6 hours as needed for Pain . (Patient not taking: Reported on 4/24/2020), Disp: 10 tablet, Rfl: 0    B-Complex-C CAPS, Take by mouth daily, Disp: , Rfl:     docusate sodium (COLACE) 100 MG capsule, Take 100 mg by mouth 2 times daily, Disp: , Rfl:     fluticasone (FLONASE) 50 MCG/ACT nasal spray, 1 spray by Nasal route daily (Patient taking differently: 1 spray by Nasal route daily as needed ), Disp: 1 Bottle, Rfl: 3          Objective:   PHYSICAL EXAM:    /76 (Site: Left Upper Arm, Position: Sitting, Cuff Size: Medium Adult)   Pulse 61   Resp 14   Ht 5' 3\" (1.6 m)   Wt 134 lb 9.6 oz (61.1 kg)   SpO2 99%   BMI 23.84 kg/m²     (Sleep ROS above)     Constitutional: no chills, no fever   Eyes: no blurred vision and no eyesight problems. ENT: no hoarseness and no sore throat. Cardiovascular: no chest pain,   Respiratory: no cough, no shortness of breath   Gastrointestinal:  no nausea,  no vomiting, no diarrhea. Musculoskeletal: no arthralgias, no back pain and no myalgias. Integumentary: no rashes or lacerations. Neurological:  no diplopia, no dizziness,  no headache, no memory changes,  and no tingling. Endocrine: No chills      Physical exam:  Gen: No acute distress. BMI of Body mass index is 23.84 kg/m². Eyes: PERRL. No sclera icterus. No conjunctival injection. ENT: No nasal/oral discharge. Pharynx clear. Neck: Trachea midline. No obvious mass. Neck circumference Neck circumference: 12.5   Resp: No accessory muscle use. No crackles. No wheezes. No rhonchi. CV: Regular rate. Regular rhythm. No murmur or rub. Skin: Warm and dry. No bleeding observed   M/S: No cyanosis.  No obvious joint deformity. Neuro: Awake. Alert. Moves all four extremities. Psych: Alert and oriented. No anxiety. Sleep Medicine 10/6/2020   Sitting and reading 1   Watching TV 0   Sitting, inactive in a public place (e.g. a theatre or a meeting) 0   As a passenger in a car for an hour without a break 1   Lying down to rest in the afternoon when circumstances permit 1   Sitting and talking to someone 0   Sitting quietly after a lunch without alcohol 0   In a car, while stopped for a few minutes in traffic 0   Total score 3   Neck circumference 12.5       DATA:   PS2020, AHI- 6.8, SpO2 89%      Assessment:      Sherre Lesches was seen today for sleep apnea. Diagnoses and all orders for this visit:    Obstructive sleep apnea  -     DME Order for CPAP as OP    Insomnia, unspecified type    ·    Plan:       1. BENITEZ. Mild BENITEZ  a. Start APAP 4-15 cwp    2. Insomnia       - Advised to not use benadryl at night and risks reviewed       - Follow up at next visit      - Advised to use CPAP 6-8 hrs at night and during naps. - Replacement of mask, tubing, head straps every 3-6 months or sooner if damaged. - Patient instructed to contact DME company for any mask, tubing or machine trouble shooting if problems arise.  - Sleep hygiene  - Avoid sedatives, alcohol and caffeinated drinks at bed time. - Patient counseled to never drive or operate heavy machinery while fatigue, drowsy or sleepy. - Weight loss is recommended as a long-term intervention.    - Complications of BENITEZ if not treated were discussed with patient patient, including: systemic hypertension, pulmonary hypertension, cardiovascular morbidities, car accidents and all cause mortality.  -Patient education handout provided regarding sleep tips and CPAP cleaning recommendations     Follow up: Return in about 2 months (around 2020).

## 2020-10-08 ENCOUNTER — TELEPHONE (OUTPATIENT)
Dept: SLEEP MEDICINE | Age: 65
End: 2020-10-08

## 2020-12-11 ENCOUNTER — TELEPHONE (OUTPATIENT)
Dept: SLEEP MEDICINE | Age: 65
End: 2020-12-11

## 2020-12-11 NOTE — TELEPHONE ENCOUNTER
Call to [facility] to check Cpap compliance, resp therapist stated that pt had refused cpap and that her spouse had bought her a bongo nasal device instead

## 2020-12-15 ENCOUNTER — TELEPHONE (OUTPATIENT)
Dept: SLEEP MEDICINE | Age: 65
End: 2020-12-15

## 2020-12-15 NOTE — TELEPHONE ENCOUNTER
Spoke with pt re appt for this morning--pt stated she cancelled the appt when she received her reminder call but the cancellation did not go through--I did cancel appt and pt is going to call later to discuss her situation

## 2021-01-13 ENCOUNTER — HOSPITAL ENCOUNTER (OUTPATIENT)
Dept: GENERAL RADIOLOGY | Age: 66
Discharge: HOME | End: 2021-01-15
Payer: MEDICARE

## 2021-01-13 DIAGNOSIS — Z12.31 ENCOUNTER FOR SCREENING MAMMOGRAM FOR BREAST CANCER: ICD-10-CM

## 2021-01-13 DIAGNOSIS — Z13.820 ENCOUNTER FOR SCREENING FOR OSTEOPOROSIS: ICD-10-CM

## 2021-01-13 PROCEDURE — 77067 SCR MAMMO BI INCL CAD: CPT

## 2021-01-13 PROCEDURE — 77080 DXA BONE DENSITY AXIAL: CPT

## 2021-04-07 ENCOUNTER — HOSPITAL ENCOUNTER (OUTPATIENT)
Dept: CT IMAGING | Age: 66
Discharge: HOME | End: 2021-04-09
Payer: MEDICARE

## 2021-04-07 ENCOUNTER — HOSPITAL ENCOUNTER (OUTPATIENT)
Age: 66
Discharge: HOME | End: 2021-04-07
Payer: MEDICARE

## 2021-04-07 DIAGNOSIS — R10.9 ABDOMINAL PAIN, UNSPECIFIED ABDOMINAL LOCATION: ICD-10-CM

## 2021-04-07 LAB
BUN BLDV-MCNC: 18 MG/DL (ref 8–23)
CREAT SERPL-MCNC: 0.8 MG/DL (ref 0.5–1)
GFR AFRICAN AMERICAN: >60
GFR NON-AFRICAN AMERICAN: >60 ML/MIN/1.73

## 2021-04-07 PROCEDURE — 82565 ASSAY OF CREATININE: CPT

## 2021-04-07 PROCEDURE — 6360000004 HC RX CONTRAST MEDICATION: Performed by: RADIOLOGY

## 2021-04-07 PROCEDURE — 2580000003 HC RX 258: Performed by: RADIOLOGY

## 2021-04-07 PROCEDURE — 84520 ASSAY OF UREA NITROGEN: CPT

## 2021-04-07 PROCEDURE — 74177 CT ABD & PELVIS W/CONTRAST: CPT

## 2021-04-07 PROCEDURE — 36415 COLL VENOUS BLD VENIPUNCTURE: CPT

## 2021-04-07 RX ORDER — SODIUM CHLORIDE 0.9 % (FLUSH) 0.9 %
10 SYRINGE (ML) INJECTION ONCE
Status: COMPLETED | OUTPATIENT
Start: 2021-04-07 | End: 2021-04-07

## 2021-04-07 RX ADMIN — IOHEXOL 50 ML: 240 INJECTION, SOLUTION INTRATHECAL; INTRAVASCULAR; INTRAVENOUS; ORAL at 15:25

## 2021-04-07 RX ADMIN — Medication 10 ML: at 15:25

## 2021-04-07 RX ADMIN — IOPAMIDOL 90 ML: 755 INJECTION, SOLUTION INTRAVENOUS at 15:25

## 2022-01-14 ENCOUNTER — HOSPITAL ENCOUNTER (OUTPATIENT)
Dept: GENERAL RADIOLOGY | Age: 67
Discharge: HOME | End: 2022-01-16
Payer: MEDICARE

## 2022-01-14 DIAGNOSIS — Z12.31 VISIT FOR SCREENING MAMMOGRAM: ICD-10-CM

## 2022-01-14 PROCEDURE — 77063 BREAST TOMOSYNTHESIS BI: CPT

## 2022-01-19 ENCOUNTER — TELEPHONE (OUTPATIENT)
Dept: GENERAL RADIOLOGY | Age: 67
End: 2022-01-19

## 2022-01-26 ENCOUNTER — HOSPITAL ENCOUNTER (OUTPATIENT)
Dept: GENERAL RADIOLOGY | Age: 67
Discharge: HOME | End: 2022-01-28
Payer: MEDICARE

## 2022-01-26 DIAGNOSIS — R92.8 ABNORMAL MAMMOGRAM OF RIGHT BREAST: ICD-10-CM

## 2022-01-26 PROCEDURE — 76642 ULTRASOUND BREAST LIMITED: CPT

## 2022-01-26 PROCEDURE — G0279 TOMOSYNTHESIS, MAMMO: HCPCS

## 2023-02-07 ENCOUNTER — HOSPITAL ENCOUNTER (OUTPATIENT)
Dept: GENERAL RADIOLOGY | Age: 68
Discharge: HOME | End: 2023-02-09
Payer: MEDICARE

## 2023-02-07 VITALS — WEIGHT: 130 LBS | BODY MASS INDEX: 23.04 KG/M2 | HEIGHT: 63 IN

## 2023-02-07 DIAGNOSIS — Z12.31 VISIT FOR SCREENING MAMMOGRAM: ICD-10-CM

## 2023-02-07 PROCEDURE — 77063 BREAST TOMOSYNTHESIS BI: CPT

## 2023-04-05 ENCOUNTER — OFFICE VISIT (OUTPATIENT)
Dept: ORTHOPEDIC SURGERY | Age: 68
End: 2023-04-05

## 2023-04-05 VITALS — HEIGHT: 63 IN | WEIGHT: 130 LBS | BODY MASS INDEX: 23.04 KG/M2

## 2023-04-05 DIAGNOSIS — M25.512 CHRONIC LEFT SHOULDER PAIN: Primary | ICD-10-CM

## 2023-04-05 DIAGNOSIS — G89.29 CHRONIC LEFT SHOULDER PAIN: Primary | ICD-10-CM

## 2023-04-05 RX ORDER — LIDOCAINE HYDROCHLORIDE 10 MG/ML
3 INJECTION, SOLUTION INFILTRATION; PERINEURAL ONCE
Status: COMPLETED | OUTPATIENT
Start: 2023-04-05 | End: 2023-04-05

## 2023-04-05 RX ORDER — BETAMETHASONE SODIUM PHOSPHATE AND BETAMETHASONE ACETATE 3; 3 MG/ML; MG/ML
6 INJECTION, SUSPENSION INTRA-ARTICULAR; INTRALESIONAL; INTRAMUSCULAR; SOFT TISSUE ONCE
Status: COMPLETED | OUTPATIENT
Start: 2023-04-05 | End: 2023-04-05

## 2023-04-05 RX ADMIN — LIDOCAINE HYDROCHLORIDE 3 ML: 10 INJECTION, SOLUTION INFILTRATION; PERINEURAL at 17:38

## 2023-04-05 RX ADMIN — BETAMETHASONE SODIUM PHOSPHATE AND BETAMETHASONE ACETATE 6 MG: 3; 3 INJECTION, SUSPENSION INTRA-ARTICULAR; INTRALESIONAL; INTRAMUSCULAR; SOFT TISSUE at 17:38

## 2023-04-08 NOTE — PROGRESS NOTES
PROCEDURE NOTE:    DIAGNOSIS      LEFT shoulder pain. PROCEDURE     Ultrasound-guided LEFT subacromial/subdeltoid bursa corticosteroid injection. PROCEDURAL PAUSE     Procedural pause conducted to verify: ?correct patient identity, procedure to be performed, and as applicable, correct side and site, correct patient position, and availability of implants, special equipment, or special requirements. PROCEDURE DETAILS     The procedure was carried out under sterile technique. Patient Position: ? Supine. Localization Process: ? The subacromial/subdeltoid bursa was evaluated under ultrasound prior to starting the procedure. ? The skin was prepped with Betadine and Alcohol. Approach: ? In-plane. Local Anesthesia: ?Local anesthesia was obtained with vapocoolant cold spray and 1 cc of 1% lidocaine using a 30-gauge 1-1/4-inch needle to create a skin wheal. ?     Injection/Aspiration: ? A 25-gauge, 2-inch needle was advanced from an in-plane approach into the subacromial/subdeltoid bursa. ? After visualization of the needle tip in the target area and negative aspiration for blood, a mixture of 3 cc of 1% lidocaine and 1 cc of betamethasone (6 mg/cc) was injected into the subacromial bursa with excellent sonographic flow. ?Images of procedure were permanently recorded. Postprocedure Care: ? The patient will avoid heavy exertion with the shoulder and avoid soaking the shoulder under water for two days. ?The patient will contact me with any problems related to the injection. PATIENT EDUCATION     Ready to learn, no apparent learning barriers were identified; learning preferences include listening. ? Explained diagnosis and treatment plan; patient expressed understanding of the content. INFORMED CONSENT     Discussed the risks, benefits, alternatives, and the necessity of other members of the healthcare team participating in the procedure. ?All questions answered and consent given.      Following

## 2023-05-17 ENCOUNTER — OFFICE VISIT (OUTPATIENT)
Dept: ORTHOPEDIC SURGERY | Age: 68
End: 2023-05-17

## 2023-05-17 VITALS — HEIGHT: 63 IN | WEIGHT: 130 LBS | BODY MASS INDEX: 23.04 KG/M2

## 2023-05-17 DIAGNOSIS — M75.102 NONTRAUMATIC TEAR OF LEFT ROTATOR CUFF, UNSPECIFIED TEAR EXTENT: Primary | ICD-10-CM

## 2023-05-17 DIAGNOSIS — G89.29 CHRONIC LEFT SHOULDER PAIN: ICD-10-CM

## 2023-05-17 DIAGNOSIS — M25.512 CHRONIC LEFT SHOULDER PAIN: ICD-10-CM

## 2023-05-17 NOTE — PROGRESS NOTES
[facility]  PRIMARY CARE SPORTS MEDICINE  DATE OF VISIT : 2023    Patient : Mansoor Hernandez  Age : 75 y.o.   : 1955  MRN : [id number]   ______________________________________________________________________    Chief Complaint :   Chief Complaint   Patient presents with    Shoulder Pain     Patient states that she received about 1 week of relief with PT and injections. Patient requesting MRI. HPI : Mansoor Hernandez is 75 y.o. female who presented to the clinic today for follow-up of left shoulder pain s/p CSI on 2023. Patient reports inadequate response to corticosteroid injection. Current symptoms include left lateral shoulder pain. Symptoms are exacerbated by repetitive use, overhead activity and laying on the affected side. Evaluation to date: XRs of the left shoulder essentially no fractures or dislocations. Treatment to date: Activity modification, oral anti-inflammatories, corticosteroid injections, physical therapy, and chiropractic modalities without significant improvement of symptoms. Past Medical History :  Past Medical History:   Diagnosis Date    Back pain     Female cystocele 2017    Fibromyalgia     Rectocele      Past Surgical History:   Procedure Laterality Date    BREAST BIOPSY Left     benign    BREAST SURGERY      benign 2003    CYSTOSCOPY  2017    anterior and posterior vaginal repair    ROTATOR CUFF REPAIR         Allergies : Allergies   Allergen Reactions    Sulfa Antibiotics     Percocet [Oxycodone-Acetaminophen] Rash       Medication List :    Current Outpatient Medications   Medication Sig Dispense Refill    mirabegron (MYRBETRIQ) 50 MG TB24 Take 50 mg by mouth daily      Biotin 1000 MCG TABS Take by mouth      Cholecalciferol (VITAMIN D) 10 MCG/ML LIQD Take by mouth      HYDROcodone-acetaminophen (NORCO) 5-325 MG per tablet Take 1 tablet by mouth every 6 hours as needed for Pain .  (Patient not taking: Reported on 2020) 10 tablet 0    Omega-3 Fatty

## 2023-05-30 ENCOUNTER — HOSPITAL ENCOUNTER (OUTPATIENT)
Dept: MRI IMAGING | Age: 68
Discharge: HOME | End: 2023-06-01
Payer: MEDICARE

## 2023-05-30 DIAGNOSIS — M75.102 NONTRAUMATIC TEAR OF LEFT ROTATOR CUFF, UNSPECIFIED TEAR EXTENT: ICD-10-CM

## 2023-05-30 PROCEDURE — 73221 MRI JOINT UPR EXTREM W/O DYE: CPT

## 2023-06-05 ENCOUNTER — HOSPITAL ENCOUNTER (OUTPATIENT)
Dept: GENERAL RADIOLOGY | Age: 68
Discharge: HOME | End: 2023-06-07
Payer: MEDICARE

## 2023-06-05 DIAGNOSIS — R92.2 BREAST DENSITY: ICD-10-CM

## 2023-06-05 PROCEDURE — 76641 ULTRASOUND BREAST COMPLETE: CPT

## 2023-06-26 ENCOUNTER — TELEPHONE (OUTPATIENT)
Dept: ORTHOPEDIC SURGERY | Age: 68
End: 2023-06-26

## 2023-08-18 ENCOUNTER — OFFICE VISIT (OUTPATIENT)
Dept: ORTHOPEDIC SURGERY | Age: 68
End: 2023-08-18

## 2023-08-18 VITALS — HEIGHT: 63 IN | BODY MASS INDEX: 23.04 KG/M2 | WEIGHT: 130 LBS

## 2023-08-18 DIAGNOSIS — M25.512 CHRONIC LEFT SHOULDER PAIN: Primary | ICD-10-CM

## 2023-08-18 DIAGNOSIS — G89.29 CHRONIC LEFT SHOULDER PAIN: Primary | ICD-10-CM

## 2023-08-18 RX ORDER — LIDOCAINE HYDROCHLORIDE 10 MG/ML
3 INJECTION, SOLUTION INFILTRATION; PERINEURAL ONCE
Status: COMPLETED | OUTPATIENT
Start: 2023-08-18 | End: 2023-08-18

## 2023-08-18 RX ORDER — BETAMETHASONE SODIUM PHOSPHATE AND BETAMETHASONE ACETATE 3; 3 MG/ML; MG/ML
6 INJECTION, SUSPENSION INTRA-ARTICULAR; INTRALESIONAL; INTRAMUSCULAR; SOFT TISSUE ONCE
Status: COMPLETED | OUTPATIENT
Start: 2023-08-18 | End: 2023-08-18

## 2023-08-18 RX ADMIN — BETAMETHASONE SODIUM PHOSPHATE AND BETAMETHASONE ACETATE 6 MG: 3; 3 INJECTION, SUSPENSION INTRA-ARTICULAR; INTRALESIONAL; INTRAMUSCULAR; SOFT TISSUE at 12:26

## 2023-08-18 RX ADMIN — LIDOCAINE HYDROCHLORIDE 3 ML: 10 INJECTION, SOLUTION INFILTRATION; PERINEURAL at 12:26

## 2023-09-08 DIAGNOSIS — M75.102 NONTRAUMATIC TEAR OF LEFT ROTATOR CUFF, UNSPECIFIED TEAR EXTENT: ICD-10-CM

## 2023-09-08 DIAGNOSIS — M25.512 CHRONIC LEFT SHOULDER PAIN: Primary | ICD-10-CM

## 2023-09-08 DIAGNOSIS — G89.29 CHRONIC LEFT SHOULDER PAIN: Primary | ICD-10-CM

## 2024-02-29 ENCOUNTER — HOSPITAL ENCOUNTER (OUTPATIENT)
Dept: GENERAL RADIOLOGY | Age: 69
Discharge: HOME | End: 2024-03-02
Payer: MEDICARE

## 2024-02-29 VITALS — BODY MASS INDEX: 23.03 KG/M2 | WEIGHT: 130 LBS

## 2024-02-29 DIAGNOSIS — Z12.31 VISIT FOR SCREENING MAMMOGRAM: ICD-10-CM

## 2024-02-29 PROCEDURE — 77063 BREAST TOMOSYNTHESIS BI: CPT

## 2024-03-25 ENCOUNTER — OFFICE VISIT (OUTPATIENT)
Dept: ORTHOPEDIC SURGERY | Age: 69
End: 2024-03-25
Payer: MEDICARE

## 2024-03-25 VITALS — HEIGHT: 63 IN | BODY MASS INDEX: 23.03 KG/M2

## 2024-03-25 DIAGNOSIS — M75.81 TENDINITIS OF RIGHT ROTATOR CUFF: ICD-10-CM

## 2024-03-25 DIAGNOSIS — S49.91XA INJURY OF RIGHT SHOULDER, INITIAL ENCOUNTER: Primary | ICD-10-CM

## 2024-03-25 PROCEDURE — 1123F ACP DISCUSS/DSCN MKR DOCD: CPT | Performed by: FAMILY MEDICINE

## 2024-03-25 PROCEDURE — 20611 DRAIN/INJ JOINT/BURSA W/US: CPT | Performed by: FAMILY MEDICINE

## 2024-03-25 PROCEDURE — 96372 THER/PROPH/DIAG INJ SC/IM: CPT | Performed by: FAMILY MEDICINE

## 2024-03-25 PROCEDURE — G8399 PT W/DXA RESULTS DOCUMENT: HCPCS | Performed by: FAMILY MEDICINE

## 2024-03-25 PROCEDURE — G8427 DOCREV CUR MEDS BY ELIG CLIN: HCPCS | Performed by: FAMILY MEDICINE

## 2024-03-25 PROCEDURE — 1090F PRES/ABSN URINE INCON ASSESS: CPT | Performed by: FAMILY MEDICINE

## 2024-03-25 PROCEDURE — G8420 CALC BMI NORM PARAMETERS: HCPCS | Performed by: FAMILY MEDICINE

## 2024-03-25 PROCEDURE — 99214 OFFICE O/P EST MOD 30 MIN: CPT | Performed by: FAMILY MEDICINE

## 2024-03-25 PROCEDURE — 3017F COLORECTAL CA SCREEN DOC REV: CPT | Performed by: FAMILY MEDICINE

## 2024-03-25 PROCEDURE — 1036F TOBACCO NON-USER: CPT | Performed by: FAMILY MEDICINE

## 2024-03-25 PROCEDURE — G8484 FLU IMMUNIZE NO ADMIN: HCPCS | Performed by: FAMILY MEDICINE

## 2024-03-25 RX ORDER — BETAMETHASONE SODIUM PHOSPHATE AND BETAMETHASONE ACETATE 3; 3 MG/ML; MG/ML
6 INJECTION, SUSPENSION INTRA-ARTICULAR; INTRALESIONAL; INTRAMUSCULAR; SOFT TISSUE ONCE
Status: COMPLETED | OUTPATIENT
Start: 2024-03-25 | End: 2024-03-25

## 2024-03-25 RX ORDER — LIDOCAINE HYDROCHLORIDE 10 MG/ML
3 INJECTION, SOLUTION INFILTRATION; PERINEURAL ONCE
Status: COMPLETED | OUTPATIENT
Start: 2024-03-25 | End: 2024-03-25

## 2024-03-25 RX ADMIN — LIDOCAINE HYDROCHLORIDE 3 ML: 10 INJECTION, SOLUTION INFILTRATION; PERINEURAL at 15:37

## 2024-03-25 RX ADMIN — BETAMETHASONE SODIUM PHOSPHATE AND BETAMETHASONE ACETATE 6 MG: 3; 3 INJECTION, SUSPENSION INTRA-ARTICULAR; INTRALESIONAL; INTRAMUSCULAR; SOFT TISSUE at 15:37

## 2024-03-25 NOTE — PROGRESS NOTES
PROCEDURE NOTE:    DIAGNOSIS      RIGHT shoulder pain.     PROCEDURE     Ultrasound-guided RIGHT subacromial/subdeltoid bursa corticosteroid injection.     PROCEDURAL PAUSE     Procedural pause conducted to verify: ?correct patient identity, procedure to be performed, and as applicable, correct side and site, correct patient position, and availability of implants, special equipment, or special requirements.     PROCEDURE DETAILS     The procedure was carried out under sterile technique.      Patient Position: ?Supine.     Localization Process: ?The subacromial/subdeltoid bursa was evaluated under ultrasound prior to starting the procedure. ?The skin was prepped with Betadine and Alcohol.    Approach: ?In-plane.     Local Anesthesia: ?Local anesthesia was obtained with vapocoolant cold spray and 1 cc of 1% lidocaine using a 30-gauge 1-1/4-inch needle to create a skin wheal. ?     Injection/Aspiration: ?A 25-gauge, 2-inch needle was advanced from an in-plane approach into the subacromial/subdeltoid bursa. ?After visualization of the needle tip in the target area and negative aspiration for blood, a mixture of 3 cc of 1% lidocaine and 1 cc of betamethasone (6 mg/cc) was injected into the subacromial bursa with excellent sonographic flow. ?Images of procedure were permanently recorded.    Postprocedure Care: ?The patient will avoid heavy exertion with the shoulder and avoid soaking the shoulder under water for two days. ?The patient will contact me with any problems related to the injection.     PATIENT EDUCATION     Ready to learn, no apparent learning barriers were identified; learning preferences include listening. ?Explained diagnosis and treatment plan; patient expressed understanding of the content.     INFORMED CONSENT     Discussed the risks, benefits, alternatives, and the necessity of other members of the healthcare team participating in the procedure. ?All questions answered and consent given.     Following 
of a MRI and referral to an orthopedic surgeon for discussion of surgical opinion. Patient wishes to proceed with conservative treatment in the form of an ultrasound-guided corticosteroid injection which was performed in the office today, please see separate procedure note for further details.  Patient will follow up in 6 weeks for reevaluation of symptoms and consider escalation therapy should symptoms persist.  Patient is agreeable with above plan all questions and concerns were addressed in the office today.     - XR SHOULDER RIGHT (MIN 2 VIEWS); Future  - lidocaine 1 % injection 3 mL  - betamethasone acetate-betamethasone sodium phosphate (CELESTONE) injection 6 mg  - US ARTHR/ASP/INJ MAJOR JNT/BURSA RIGHT; Future    Return to Office: Return in about 6 weeks (around 5/6/2024) for injection follow up.    Lj Barraza MD

## 2025-03-04 ENCOUNTER — HOSPITAL ENCOUNTER (OUTPATIENT)
Dept: GENERAL RADIOLOGY | Age: 70
Discharge: HOME | End: 2025-03-06
Payer: MEDICARE

## 2025-03-04 VITALS — BODY MASS INDEX: 23.04 KG/M2 | HEIGHT: 63 IN | WEIGHT: 130 LBS

## 2025-03-04 DIAGNOSIS — Z12.31 VISIT FOR SCREENING MAMMOGRAM: ICD-10-CM

## 2025-03-04 PROCEDURE — 77063 BREAST TOMOSYNTHESIS BI: CPT

## 2025-03-07 ENCOUNTER — TELEPHONE (OUTPATIENT)
Dept: GENERAL RADIOLOGY | Age: 70
End: 2025-03-07

## 2025-03-07 NOTE — TELEPHONE ENCOUNTER
Call to patient in reference to her mammogram performed at [facility] on March 4, 2025.  Instructed patient that the radiologist has recommended some additional breast imaging, in order to make a final determination/result.  The radiologist did not compare previous imaging.  Put on addendum list for Dr Carpenter.  I will update patient once addendum is done.

## 2025-03-20 ENCOUNTER — TELEPHONE (OUTPATIENT)
Dept: GENERAL RADIOLOGY | Age: 70
End: 2025-03-20

## 2025-03-20 NOTE — TELEPHONE ENCOUNTER
Voice mail for patient regarding addendum made to mammography report.  Patient's exam has now been compared to prior imaging studies.  There is continued need for additional imaging as initially described.  Order given to scheduling.  Left contact info for any questions.

## 2025-05-16 ENCOUNTER — HOSPITAL ENCOUNTER (OUTPATIENT)
Dept: GENERAL RADIOLOGY | Age: 70
Discharge: HOME | End: 2025-05-18
Payer: MEDICARE

## 2025-05-16 VITALS — WEIGHT: 130 LBS | HEIGHT: 63 IN | BODY MASS INDEX: 23.04 KG/M2

## 2025-05-16 DIAGNOSIS — Z13.820 SCREENING FOR OSTEOPOROSIS: ICD-10-CM

## 2025-05-16 DIAGNOSIS — R92.8 ABNORMAL MAMMOGRAM: ICD-10-CM

## 2025-05-16 PROCEDURE — 76642 ULTRASOUND BREAST LIMITED: CPT

## 2025-05-16 PROCEDURE — 77065 DX MAMMO INCL CAD UNI: CPT

## 2025-05-16 PROCEDURE — 77080 DXA BONE DENSITY AXIAL: CPT
